# Patient Record
Sex: MALE | Race: WHITE | NOT HISPANIC OR LATINO | Employment: UNEMPLOYED | URBAN - METROPOLITAN AREA
[De-identification: names, ages, dates, MRNs, and addresses within clinical notes are randomized per-mention and may not be internally consistent; named-entity substitution may affect disease eponyms.]

---

## 2022-12-31 ENCOUNTER — HOSPITAL ENCOUNTER (EMERGENCY)
Facility: HOSPITAL | Age: 62
Discharge: HOME | End: 2022-12-31
Attending: STUDENT IN AN ORGANIZED HEALTH CARE EDUCATION/TRAINING PROGRAM
Payer: MEDICAID

## 2022-12-31 VITALS
DIASTOLIC BLOOD PRESSURE: 91 MMHG | HEIGHT: 74 IN | RESPIRATION RATE: 18 BRPM | WEIGHT: 230 LBS | OXYGEN SATURATION: 99 % | BODY MASS INDEX: 29.52 KG/M2 | HEART RATE: 79 BPM | SYSTOLIC BLOOD PRESSURE: 143 MMHG | TEMPERATURE: 99 F

## 2022-12-31 DIAGNOSIS — U07.1 COVID: Primary | ICD-10-CM

## 2022-12-31 DIAGNOSIS — R55 SYNCOPE, UNSPECIFIED SYNCOPE TYPE: ICD-10-CM

## 2022-12-31 DIAGNOSIS — R00.0 TACHYCARDIA: ICD-10-CM

## 2022-12-31 LAB
ALBUMIN SERPL-MCNC: 3.7 G/DL (ref 3.4–4.8)
ALBUMIN/GLOB SERPL: 1.1 RATIO (ref 1.1–2)
ALP SERPL-CCNC: 110 UNIT/L (ref 40–150)
ALT SERPL-CCNC: 9 UNIT/L (ref 0–55)
AMPHET UR QL SCN: POSITIVE
APPEARANCE UR: CLEAR
AST SERPL-CCNC: 20 UNIT/L (ref 5–34)
BACTERIA #/AREA URNS AUTO: NORMAL /HPF
BARBITURATE SCN PRESENT UR: NEGATIVE
BASOPHILS # BLD AUTO: 0.02 X10(3)/MCL (ref 0–0.2)
BASOPHILS NFR BLD AUTO: 0.5 %
BENZODIAZ UR QL SCN: NEGATIVE
BILIRUB UR QL STRIP.AUTO: NEGATIVE MG/DL
BILIRUBIN DIRECT+TOT PNL SERPL-MCNC: 0.7 MG/DL
BUN SERPL-MCNC: 14.7 MG/DL (ref 8.4–25.7)
CALCIUM SERPL-MCNC: 8.8 MG/DL (ref 8.8–10)
CANNABINOIDS UR QL SCN: NEGATIVE
CHLORIDE SERPL-SCNC: 108 MMOL/L (ref 98–107)
CO2 SERPL-SCNC: 21 MMOL/L (ref 23–31)
COCAINE UR QL SCN: POSITIVE
COLOR UR AUTO: YELLOW
CORRECTED TEMPERATURE (PCO2): 31 MMHG (ref 35–45)
CORRECTED TEMPERATURE (PH): 7.45 (ref 7.35–7.45)
CORRECTED TEMPERATURE (PO2): 103 MMHG (ref 80–100)
CREAT SERPL-MCNC: 1.08 MG/DL (ref 0.73–1.18)
EOSINOPHIL # BLD AUTO: 0.01 X10(3)/MCL (ref 0–0.9)
EOSINOPHIL NFR BLD AUTO: 0.3 %
ERYTHROCYTE [DISTWIDTH] IN BLOOD BY AUTOMATED COUNT: 13 % (ref 11.6–14.4)
ETHANOL SERPL-MCNC: <10 MG/DL
FENTANYL UR QL SCN: NEGATIVE
FLUAV AG UPPER RESP QL IA.RAPID: NOT DETECTED
FLUBV AG UPPER RESP QL IA.RAPID: NOT DETECTED
GFR SERPLBLD CREATININE-BSD FMLA CKD-EPI: >60 MLS/MIN/1.73/M2
GLOBULIN SER-MCNC: 3.5 GM/DL (ref 2.4–3.5)
GLUCOSE SERPL-MCNC: 104 MG/DL (ref 82–115)
GLUCOSE UR QL STRIP.AUTO: NEGATIVE MG/DL
HCO3 UR-SCNC: 21.5 MMOL/L (ref 22–26)
HCT VFR BLD AUTO: 39.9 % (ref 42–52)
HGB BLD-MCNC: 13.7 GM/DL (ref 14–18)
HGB BLD-MCNC: 13.8 G/DL (ref 12–16)
IMM GRANULOCYTES # BLD AUTO: 0.01 X10(3)/MCL (ref 0–0.04)
IMM GRANULOCYTES NFR BLD AUTO: 0.3 %
KETONES UR QL STRIP.AUTO: ABNORMAL MG/DL
LEUKOCYTE ESTERASE UR QL STRIP.AUTO: NEGATIVE UNIT/L
LYMPHOCYTES # BLD AUTO: 0.82 X10(3)/MCL (ref 0.6–4.6)
LYMPHOCYTES NFR BLD AUTO: 21.5 %
MAGNESIUM SERPL-MCNC: 2.1 MG/DL (ref 1.6–2.6)
MCH RBC QN AUTO: 31.6 PG
MCHC RBC AUTO-ENTMCNC: 34.3 MG/DL (ref 33–36)
MCV RBC AUTO: 91.9 FL (ref 80–94)
MDMA UR QL SCN: NEGATIVE
MONOCYTES # BLD AUTO: 0.53 X10(3)/MCL (ref 0.1–1.3)
MONOCYTES NFR BLD AUTO: 13.9 %
NEUTROPHILS # BLD AUTO: 2.43 X10(3)/MCL (ref 2.1–9.2)
NEUTROPHILS NFR BLD AUTO: 63.5 %
NITRITE UR QL STRIP.AUTO: NEGATIVE
NRBC BLD AUTO-RTO: 0 % (ref 0–1)
OPIATES UR QL SCN: NEGATIVE
PCO2 BLDA: 31 MMHG (ref 35–45)
PCP UR QL: NEGATIVE
PH SMN: 7.45 [PH] (ref 7.35–7.45)
PH UR STRIP.AUTO: 5.5 [PH]
PH UR: 5.5 [PH] (ref 3–11)
PLATELET # BLD AUTO: 123 X10(3)/MCL (ref 140–371)
PMV BLD AUTO: 9.4 FL (ref 9.4–12.4)
PO2 BLDA: 103 MMHG (ref 80–100)
POC BASE DEFICIT: -1.6 MMOL/L (ref -2–2)
POC COHB: 1.7 %
POC IONIZED CALCIUM: 1.15 MMOL/L (ref 1.12–1.23)
POC METHB: 0.8 % (ref 0.4–1.5)
POC O2HB: 96.4 % (ref 94–97)
POC SATURATED O2: 98.2 %
POC TEMPERATURE: 37 °C
POCT GLUCOSE: 100 MG/DL (ref 70–110)
POTASSIUM BLD-SCNC: 3.1 MMOL/L (ref 3.5–5)
POTASSIUM SERPL-SCNC: 3.4 MMOL/L (ref 3.5–5.1)
PROT SERPL-MCNC: 7.2 GM/DL (ref 5.8–7.6)
PROT UR QL STRIP.AUTO: ABNORMAL MG/DL
RBC # BLD AUTO: 4.34 X10(6)/MCL (ref 4.7–6.1)
RBC #/AREA URNS AUTO: <5 /HPF
RBC UR QL AUTO: NEGATIVE UNIT/L
SARS-COV-2 RNA RESP QL NAA+PROBE: DETECTED
SODIUM BLD-SCNC: 137 MMOL/L (ref 137–145)
SODIUM SERPL-SCNC: 139 MMOL/L (ref 136–145)
SP GR UR STRIP.AUTO: 1.02 (ref 1–1.03)
SPECIMEN SOURCE: ABNORMAL
SQUAMOUS #/AREA URNS AUTO: <5 /HPF
TROPONIN I SERPL-MCNC: 0.02 NG/ML (ref 0–0.04)
TSH SERPL-ACNC: 0.98 UIU/ML (ref 0.35–4.94)
UROBILINOGEN UR STRIP-ACNC: 1 MG/DL
WBC # SPEC AUTO: 3.8 X10(3)/MCL (ref 4.5–11.5)
WBC #/AREA URNS AUTO: <5 /HPF

## 2022-12-31 PROCEDURE — 80053 COMPREHEN METABOLIC PANEL: CPT | Performed by: STUDENT IN AN ORGANIZED HEALTH CARE EDUCATION/TRAINING PROGRAM

## 2022-12-31 PROCEDURE — 36600 WITHDRAWAL OF ARTERIAL BLOOD: CPT

## 2022-12-31 PROCEDURE — 84443 ASSAY THYROID STIM HORMONE: CPT | Performed by: STUDENT IN AN ORGANIZED HEALTH CARE EDUCATION/TRAINING PROGRAM

## 2022-12-31 PROCEDURE — 80307 DRUG TEST PRSMV CHEM ANLYZR: CPT | Performed by: STUDENT IN AN ORGANIZED HEALTH CARE EDUCATION/TRAINING PROGRAM

## 2022-12-31 PROCEDURE — 63600175 PHARM REV CODE 636 W HCPCS: Performed by: STUDENT IN AN ORGANIZED HEALTH CARE EDUCATION/TRAINING PROGRAM

## 2022-12-31 PROCEDURE — 82077 ASSAY SPEC XCP UR&BREATH IA: CPT | Performed by: STUDENT IN AN ORGANIZED HEALTH CARE EDUCATION/TRAINING PROGRAM

## 2022-12-31 PROCEDURE — 82803 BLOOD GASES ANY COMBINATION: CPT

## 2022-12-31 PROCEDURE — 84484 ASSAY OF TROPONIN QUANT: CPT | Performed by: STUDENT IN AN ORGANIZED HEALTH CARE EDUCATION/TRAINING PROGRAM

## 2022-12-31 PROCEDURE — 99900035 HC TECH TIME PER 15 MIN (STAT)

## 2022-12-31 PROCEDURE — 82962 GLUCOSE BLOOD TEST: CPT

## 2022-12-31 PROCEDURE — 0240U COVID/FLU A&B PCR: CPT | Performed by: STUDENT IN AN ORGANIZED HEALTH CARE EDUCATION/TRAINING PROGRAM

## 2022-12-31 PROCEDURE — 99284 EMERGENCY DEPT VISIT MOD MDM: CPT | Mod: 25

## 2022-12-31 PROCEDURE — 85025 COMPLETE CBC W/AUTO DIFF WBC: CPT | Performed by: STUDENT IN AN ORGANIZED HEALTH CARE EDUCATION/TRAINING PROGRAM

## 2022-12-31 PROCEDURE — 83735 ASSAY OF MAGNESIUM: CPT | Performed by: STUDENT IN AN ORGANIZED HEALTH CARE EDUCATION/TRAINING PROGRAM

## 2022-12-31 PROCEDURE — 81001 URINALYSIS AUTO W/SCOPE: CPT | Mod: 59 | Performed by: STUDENT IN AN ORGANIZED HEALTH CARE EDUCATION/TRAINING PROGRAM

## 2022-12-31 PROCEDURE — 96360 HYDRATION IV INFUSION INIT: CPT

## 2022-12-31 RX ORDER — METOPROLOL TARTRATE 50 MG/1
50 TABLET ORAL 2 TIMES DAILY
COMMUNITY

## 2022-12-31 RX ADMIN — SODIUM CHLORIDE, POTASSIUM CHLORIDE, SODIUM LACTATE AND CALCIUM CHLORIDE 1000 ML: 600; 310; 30; 20 INJECTION, SOLUTION INTRAVENOUS at 03:12

## 2022-12-31 NOTE — ED PROVIDER NOTES
Encounter Date: 12/31/2022    SCRIBE #1 NOTE: I, Delroy Norris, am scribing for, and in the presence of,  Deniz Coleman MD. I have scribed the following portions of the note - the EKG reading. Other sections scribed: HPI, ROS, PE.     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  States has happened to him before. Denies any CP, SOB. Patient states he does have a cardiac history, but isn't sure what his diagnoses were.      61 y/o male with a history of HTN presents to the ED with complaints of dizziness and near-syncope. Pt was working in somebody's house when he stood up, became lightheaded and dizzy, and almost passed out. He denies syncope, falls, LOC, and hitting his head. Notes that this is similar to event that happened 2 years ago but does not remember any diagnoses from the event. Reports that he is compliant with his metoprolol. Admits to drinking and smoking cocaine, denies any pain. Pt is a poor historian.    The history is provided by the patient. No  was used.   Illness   The current episode started today. Exacerbated by: standing. Pertinent negatives include no fever, no abdominal pain, no constipation, no diarrhea, no nausea, no vomiting, no congestion, no ear discharge, no ear pain, no headaches, no rhinorrhea, no sore throat, no neck pain, no cough, no shortness of breath, no pain and no eye redness.   Review of patient's allergies indicates:  No Known Allergies  Past Medical History:   Diagnosis Date    Hypertension      History reviewed. No pertinent surgical history.  History reviewed. No pertinent family history.  Social History     Tobacco Use    Smokeless tobacco: Never   Substance Use Topics    Alcohol use: Yes     Alcohol/week: 3.0 standard drinks     Types: 3 Standard drinks or equivalent per week    Drug use: Yes      Frequency: 1.0 times per week     Types: Cocaine, Methamphetamines     Review of Systems   Constitutional:  Negative for chills, fatigue and fever.   HENT:  Negative for congestion, ear discharge, ear pain, nosebleeds, rhinorrhea and sore throat.    Eyes:  Negative for pain, redness and visual disturbance.   Respiratory:  Negative for cough, chest tightness and shortness of breath.    Cardiovascular:  Negative for chest pain and leg swelling.   Gastrointestinal:  Negative for abdominal pain, blood in stool, constipation, diarrhea, nausea and vomiting.   Genitourinary:  Negative for dysuria and hematuria.   Musculoskeletal:  Negative for arthralgias, joint swelling, myalgias and neck pain.   Skin:  Negative for color change, pallor and wound.   Neurological:  Positive for dizziness and light-headedness. Negative for weakness, numbness and headaches.     Physical Exam     Initial Vitals   BP Pulse Resp Temp SpO2   12/31/22 1215 12/31/22 1215 12/31/22 1215 12/31/22 1217 12/31/22 1215   (!) 151/99 (!) 111 19 99 °F (37.2 °C) 98 %      MAP       --                Physical Exam    Nursing note and vitals reviewed.  Constitutional: He appears well-developed and well-nourished. He is not diaphoretic. No distress.   HENT:   Head: Normocephalic and atraumatic.   Right Ear: External ear normal.   Left Ear: External ear normal.   Nose: Nose normal.   Mouth/Throat: Oropharynx is clear and moist.   Eyes: Conjunctivae and EOM are normal. Pupils are equal, round, and reactive to light. Right eye exhibits no discharge. Left eye exhibits no discharge.   Neck: Neck supple. No tracheal deviation present.   Normal range of motion.  Cardiovascular:  Normal rate, regular rhythm, normal heart sounds and intact distal pulses.     Exam reveals no gallop and no friction rub.       No murmur heard.  Pulmonary/Chest: Breath sounds normal. No stridor. No respiratory distress. He has no wheezes. He has no rhonchi. He has no rales. He exhibits no  tenderness.   Abdominal: Abdomen is soft. Bowel sounds are normal. He exhibits no distension and no mass. There is no abdominal tenderness. There is no guarding.   Genitourinary:    Genitourinary Comments: Hernia in the scrotum; non-tender     Musculoskeletal:         General: No tenderness or edema. Normal range of motion.      Cervical back: Normal range of motion and neck supple.     Neurological: He is alert and oriented to person, place, and time. No cranial nerve deficit or sensory deficit.   Skin: Skin is warm and dry. Capillary refill takes less than 2 seconds. No rash noted. No erythema. No pallor.       ED Course   Procedures  Labs Reviewed   COMPREHENSIVE METABOLIC PANEL - Abnormal; Notable for the following components:       Result Value    Potassium Level 3.4 (*)     Chloride 108 (*)     Carbon Dioxide 21 (*)     All other components within normal limits   COVID/FLU A&B PCR - Abnormal; Notable for the following components:    SARS-CoV-2 PCR Detected (*)     All other components within normal limits    Narrative:     The Xpert Xpress SARS-CoV-2/FLU/RSV plus is a rapid, multiplexed real-time PCR test intended for the simultaneous qualitative detection and differentiation of SARS-CoV-2, Influenza A, Influenza B, and respiratory syncytial virus (RSV) viral RNA in either nasopharyngeal swab or nasal swab specimens.         URINALYSIS, REFLEX TO URINE CULTURE - Abnormal; Notable for the following components:    Protein, UA 1+ (*)     Ketones, UA 1+ (*)     All other components within normal limits   CBC WITH DIFFERENTIAL - Abnormal; Notable for the following components:    WBC 3.8 (*)     RBC 4.34 (*)     Hgb 13.7 (*)     Hct 39.9 (*)     Platelet 123 (*)     All other components within normal limits   DRUG SCREEN, URINE (BEAKER) - Abnormal; Notable for the following components:    Amphetamines, Urine Positive (*)     Cocaine, Urine Positive (*)     All other components within normal limits    Narrative:      Cut off concentrations:    Amphetamines - 1000 ng/ml  Barbiturates - 200 ng/ml  Benzodiazepine - 200 ng/ml  Cannabinoids (THC) - 50 ng/ml  Cocaine - 300 ng/ml  Fentanyl - 1.0 ng/ml  MDMA - 500 ng/ml  Opiates - 300 ng/ml   Phencyclidine (PCP) - 25 ng/ml    Specimen submitted for drug analysis and tested for pH and specific gravity in order to evaluate sample integrity. Suspect tampering if specific gravity is <1.003 and/or pH is not within the range of 4.5 - 8.0  False negatives may result form substances such as bleach added to urine.  False positives may result for the presence of a substance with similar chemical structure to the drug or its metabolite.    This test provides only a PRELIMINARY analytical test result. A more specific alternate chemical method must be used in order to obtain a confirmed analytical result. Gas chromatography/mass spectrometry (GC/MS) is the preferred confirmatory method. Other chemical confirmation methods are available. Clinical consideration and professional judgement should be applied to any drug of abuse test result, particularly when preliminary positive results are used.    Positive results will be confirmed only at the physicians request. Unconfirmed screening results are to be used only for medical purposes (treatment).        MAGNESIUM - Normal   TROPONIN I - Normal   TSH - Normal   ALCOHOL,MEDICAL (ETHANOL) - Normal   URINALYSIS, MICROSCOPIC - Normal   CBC W/ AUTO DIFFERENTIAL    Narrative:     The following orders were created for panel order CBC auto differential.  Procedure                               Abnormality         Status                     ---------                               -----------         ------                     CBC with Differential[[id number]]        Abnormal            Final result                 Please view results for these tests on the individual orders.   POCT GLUCOSE     EKG Readings: (Independently Interpreted)   Rhythm: Sinus Tachycardia.  Heart Rate: 107. ST Segments: Normal ST Segments. Axis: Left Axis Deviation.   Time: 12:19    Good R wave progression     Imaging Results              X-Ray Chest AP Portable (Final result)  Result time 12/31/22 13:28:15      Final result by Joey Freedman MD (12/31/22 13:28:15)                   Impression:      NO ACUTE CARDIOPULMONARY PROCESS IDENTIFIED.      Electronically signed by: Joey Freedman  Date:    12/31/2022  Time:    13:28               Narrative:    EXAMINATION:  XR CHEST AP PORTABLE    CLINICAL HISTORY:  syncope;    TECHNIQUE:  One view    COMPARISON:  August 5, 2020.    FINDINGS:  Cardiopericardial silhouette is within normal limits. Lungs are without dense focal or segmental consolidation, congestive process, pleural effusions or pneumothorax.                                       Medications   lactated ringers bolus 1,000 mL (0 mLs Intravenous Stopped 12/31/22 1621)     Medical Decision Making:   Initial Assessment:   Patient presents emergency department reports that he felt dizzy earlier today  Differential Diagnosis:   Near syncope, cardiogenic syncope, dehydration, infection, COVID, flu, drug abuse from alcohol intoxication  Clinical Tests:   Lab Tests: Ordered and Reviewed  Radiological Study: Reviewed and Ordered  Medical Tests: Reviewed and Ordered  ED Management:  Patient is awake alert generally well-appearing.  He is tachycardic.  He reports he felt lightheaded earlier today but never lost consciousness.  Denies falling or striking head.  He states he had similar episodes in the past but does not remember what occurred then.  Patient's labs were largely unremarkable, he does admit to cocaine and amphetamine abuse which does appear UDS.  His eyeball is undetectable.  He is unfortunately COVID positive this may be the source of his symptoms.  Denies any shortness of breath or chest pain.  Chest x-ray unremarkable, oxygen saturation greater than 95% on room air.  His tachycardia  resolves after 1 L of fluid here in the emergency department, he is not orthostatic.  He is suitable for discharge home at this time.  He only takes metoprolol will be discharged with paxlovid.  I gave offer patient admission however he declined he is comfortable going home.  Patient will be discharged home at this time. Return precautions given.  Questions invited, questions answered to the best my ability.  Patient discharged home condition stable.          Scribe Attestation:   Scribe #1: I performed the above scribed service and the documentation accurately describes the services I performed. I attest to the accuracy of the note.    Attending Attestation:           Physician Attestation for Scribe:  Physician Attestation Statement for Scribe #1: I, Deniz Coleman MD, reviewed documentation, as scribed by Delroy Norris in my presence, and it is both accurate and complete.                        Clinical Impression:   Final diagnoses:  [U07.1] COVID (Primary)  [R00.0] Tachycardia  [R55] Syncope, unspecified syncope type        ED Disposition Condition    Discharge Stable          ED Prescriptions       Medication Sig Dispense Start Date End Date Auth. Provider    nirmatrelvir-ritonavir 300 mg (150 mg x 2)-100 mg copackaged tablets (EUA) Take 3 tablets by mouth 2 (two) times daily for 5 days. Each dose contains 2 nirmatrelvir (pink tablets) and 1 ritonavir (white tablet). Take all 3 tablets together 30 tablet 12/31/2022 1/5/2023 Deniz Coleman MD          Follow-up Information       Follow up With Specialties Details Why Contact Info    [facility]   [address] [place] [id number] [phone]               Deniz Coleman MD  12/31/22 5039

## 2023-02-23 ENCOUNTER — HOSPITAL ENCOUNTER (OUTPATIENT)
Facility: HOSPITAL | Age: 63
Discharge: HOME | DRG: 690 | End: 2023-02-25
Attending: STUDENT IN AN ORGANIZED HEALTH CARE EDUCATION/TRAINING PROGRAM | Admitting: STUDENT IN AN ORGANIZED HEALTH CARE EDUCATION/TRAINING PROGRAM
Payer: MEDICAID

## 2023-02-23 DIAGNOSIS — K40.91 UNILATERAL RECURRENT INGUINAL HERNIA WITHOUT OBSTRUCTION OR GANGRENE: Primary | ICD-10-CM

## 2023-02-23 DIAGNOSIS — R07.9 CHEST PAIN: ICD-10-CM

## 2023-02-23 DIAGNOSIS — R10.9 FLANK PAIN: ICD-10-CM

## 2023-02-23 DIAGNOSIS — N12 PYELONEPHRITIS: ICD-10-CM

## 2023-02-23 DIAGNOSIS — I50.9 CHF (CONGESTIVE HEART FAILURE): ICD-10-CM

## 2023-02-23 PROBLEM — N13.5 URETEROVESICAL JUNCTION (UVJ) OBSTRUCTION: Status: ACTIVE | Noted: 2023-02-23

## 2023-02-23 LAB
ALBUMIN SERPL-MCNC: 4.1 G/DL (ref 3.4–4.8)
ALBUMIN/GLOB SERPL: 1.2 RATIO (ref 1.1–2)
ALP SERPL-CCNC: 128 UNIT/L (ref 40–150)
ALT SERPL-CCNC: 13 UNIT/L (ref 0–55)
APPEARANCE UR: CLEAR
AST SERPL-CCNC: 21 UNIT/L (ref 5–34)
BACTERIA #/AREA URNS AUTO: ABNORMAL /HPF
BASOPHILS # BLD AUTO: 0.05 X10(3)/MCL (ref 0–0.2)
BASOPHILS NFR BLD AUTO: 0.5 %
BILIRUB UR QL STRIP.AUTO: NEGATIVE MG/DL
BILIRUBIN DIRECT+TOT PNL SERPL-MCNC: 1.3 MG/DL
BUN SERPL-MCNC: 21.5 MG/DL (ref 8.4–25.7)
CALCIUM SERPL-MCNC: 9 MG/DL (ref 8.8–10)
CHLORIDE SERPL-SCNC: 104 MMOL/L (ref 98–107)
CO2 SERPL-SCNC: 25 MMOL/L (ref 23–31)
COLOR UR AUTO: YELLOW
CREAT SERPL-MCNC: 1.38 MG/DL (ref 0.73–1.18)
EOSINOPHIL # BLD AUTO: 0.05 X10(3)/MCL (ref 0–0.9)
EOSINOPHIL NFR BLD AUTO: 0.5 %
ERYTHROCYTE [DISTWIDTH] IN BLOOD BY AUTOMATED COUNT: 13.5 % (ref 11.5–17)
GFR SERPLBLD CREATININE-BSD FMLA CKD-EPI: 58 MLS/MIN/1.73/M2
GLOBULIN SER-MCNC: 3.3 GM/DL (ref 2.4–3.5)
GLUCOSE SERPL-MCNC: 121 MG/DL (ref 82–115)
GLUCOSE UR QL STRIP.AUTO: NEGATIVE MG/DL
HCT VFR BLD AUTO: 43.6 % (ref 42–52)
HGB BLD-MCNC: 15.3 G/DL (ref 14–18)
IMM GRANULOCYTES # BLD AUTO: 0.04 X10(3)/MCL (ref 0–0.04)
IMM GRANULOCYTES NFR BLD AUTO: 0.4 %
KETONES UR QL STRIP.AUTO: NEGATIVE MG/DL
LEUKOCYTE ESTERASE UR QL STRIP.AUTO: NEGATIVE UNIT/L
LYMPHOCYTES # BLD AUTO: 1.31 X10(3)/MCL (ref 0.6–4.6)
LYMPHOCYTES NFR BLD AUTO: 14.3 %
MCH RBC QN AUTO: 31.8 PG
MCHC RBC AUTO-ENTMCNC: 35.1 G/DL (ref 33–36)
MCV RBC AUTO: 90.6 FL (ref 80–94)
MONOCYTES # BLD AUTO: 0.81 X10(3)/MCL (ref 0.1–1.3)
MONOCYTES NFR BLD AUTO: 8.9 %
NEUTROPHILS # BLD AUTO: 6.89 X10(3)/MCL (ref 2.1–9.2)
NEUTROPHILS NFR BLD AUTO: 75.4 %
NITRITE UR QL STRIP.AUTO: NEGATIVE
NRBC BLD AUTO-RTO: 0 %
PH UR STRIP.AUTO: 6 [PH]
PLATELET # BLD AUTO: 184 X10(3)/MCL (ref 130–400)
PMV BLD AUTO: 9.7 FL (ref 7.4–10.4)
POTASSIUM SERPL-SCNC: 4.1 MMOL/L (ref 3.5–5.1)
PROT SERPL-MCNC: 7.4 GM/DL (ref 5.8–7.6)
PROT UR QL STRIP.AUTO: ABNORMAL MG/DL
RBC # BLD AUTO: 4.81 X10(6)/MCL (ref 4.7–6.1)
RBC #/AREA URNS AUTO: 16 /HPF
RBC UR QL AUTO: ABNORMAL UNIT/L
SODIUM SERPL-SCNC: 135 MMOL/L (ref 136–145)
SP GR UR STRIP.AUTO: 1.02 (ref 1–1.03)
SQUAMOUS #/AREA URNS AUTO: <5 /HPF
UROBILINOGEN UR STRIP-ACNC: 0.2 MG/DL
WBC # SPEC AUTO: 9.2 X10(3)/MCL (ref 4.5–11.5)
WBC #/AREA URNS AUTO: <5 /HPF

## 2023-02-23 PROCEDURE — G0378 HOSPITAL OBSERVATION PER HR: HCPCS

## 2023-02-23 PROCEDURE — 87040 BLOOD CULTURE FOR BACTERIA: CPT | Performed by: STUDENT IN AN ORGANIZED HEALTH CARE EDUCATION/TRAINING PROGRAM

## 2023-02-23 PROCEDURE — 25000003 PHARM REV CODE 250: Performed by: STUDENT IN AN ORGANIZED HEALTH CARE EDUCATION/TRAINING PROGRAM

## 2023-02-23 PROCEDURE — 63600175 PHARM REV CODE 636 W HCPCS

## 2023-02-23 PROCEDURE — 93005 ELECTROCARDIOGRAM TRACING: CPT

## 2023-02-23 PROCEDURE — 80053 COMPREHEN METABOLIC PANEL: CPT | Performed by: STUDENT IN AN ORGANIZED HEALTH CARE EDUCATION/TRAINING PROGRAM

## 2023-02-23 PROCEDURE — 96361 HYDRATE IV INFUSION ADD-ON: CPT

## 2023-02-23 PROCEDURE — 93010 EKG 12-LEAD: ICD-10-PCS | Mod: ,,, | Performed by: STUDENT IN AN ORGANIZED HEALTH CARE EDUCATION/TRAINING PROGRAM

## 2023-02-23 PROCEDURE — 96376 TX/PRO/DX INJ SAME DRUG ADON: CPT

## 2023-02-23 PROCEDURE — 99285 EMERGENCY DEPT VISIT HI MDM: CPT | Mod: 25

## 2023-02-23 PROCEDURE — 85025 COMPLETE CBC W/AUTO DIFF WBC: CPT | Performed by: STUDENT IN AN ORGANIZED HEALTH CARE EDUCATION/TRAINING PROGRAM

## 2023-02-23 PROCEDURE — 25500020 PHARM REV CODE 255

## 2023-02-23 PROCEDURE — 63600175 PHARM REV CODE 636 W HCPCS: Performed by: STUDENT IN AN ORGANIZED HEALTH CARE EDUCATION/TRAINING PROGRAM

## 2023-02-23 PROCEDURE — 25000003 PHARM REV CODE 250

## 2023-02-23 PROCEDURE — 81001 URINALYSIS AUTO W/SCOPE: CPT | Performed by: STUDENT IN AN ORGANIZED HEALTH CARE EDUCATION/TRAINING PROGRAM

## 2023-02-23 PROCEDURE — 96365 THER/PROPH/DIAG IV INF INIT: CPT | Mod: 59

## 2023-02-23 PROCEDURE — 93010 ELECTROCARDIOGRAM REPORT: CPT | Mod: ,,, | Performed by: STUDENT IN AN ORGANIZED HEALTH CARE EDUCATION/TRAINING PROGRAM

## 2023-02-23 PROCEDURE — 96375 TX/PRO/DX INJ NEW DRUG ADDON: CPT

## 2023-02-23 PROCEDURE — 11000001 HC ACUTE MED/SURG PRIVATE ROOM

## 2023-02-23 RX ORDER — METOPROLOL TARTRATE 50 MG/1
50 TABLET ORAL 2 TIMES DAILY
Status: DISCONTINUED | OUTPATIENT
Start: 2023-02-24 | End: 2023-02-25 | Stop reason: HOSPADM

## 2023-02-23 RX ORDER — CLONIDINE HYDROCHLORIDE 0.2 MG/1
0.2 TABLET ORAL 3 TIMES DAILY PRN
Status: DISCONTINUED | OUTPATIENT
Start: 2023-02-23 | End: 2023-02-25 | Stop reason: HOSPADM

## 2023-02-23 RX ORDER — KETOROLAC TROMETHAMINE 30 MG/ML
15 INJECTION, SOLUTION INTRAMUSCULAR; INTRAVENOUS
Status: COMPLETED | OUTPATIENT
Start: 2023-02-23 | End: 2023-02-23

## 2023-02-23 RX ORDER — ONDANSETRON 2 MG/ML
4 INJECTION INTRAMUSCULAR; INTRAVENOUS
Status: COMPLETED | OUTPATIENT
Start: 2023-02-23 | End: 2023-02-23

## 2023-02-23 RX ORDER — PROCHLORPERAZINE EDISYLATE 5 MG/ML
5 INJECTION INTRAMUSCULAR; INTRAVENOUS EVERY 6 HOURS PRN
Status: DISCONTINUED | OUTPATIENT
Start: 2023-02-24 | End: 2023-02-25 | Stop reason: HOSPADM

## 2023-02-23 RX ORDER — POLYETHYLENE GLYCOL 3350 17 G/17G
17 POWDER, FOR SOLUTION ORAL 2 TIMES DAILY PRN
Status: DISCONTINUED | OUTPATIENT
Start: 2023-02-24 | End: 2023-02-25 | Stop reason: HOSPADM

## 2023-02-23 RX ORDER — ONDANSETRON 2 MG/ML
4 INJECTION INTRAMUSCULAR; INTRAVENOUS EVERY 4 HOURS PRN
Status: DISCONTINUED | OUTPATIENT
Start: 2023-02-24 | End: 2023-02-25 | Stop reason: HOSPADM

## 2023-02-23 RX ORDER — MORPHINE SULFATE 4 MG/ML
4 INJECTION, SOLUTION INTRAMUSCULAR; INTRAVENOUS
Status: COMPLETED | OUTPATIENT
Start: 2023-02-23 | End: 2023-02-23

## 2023-02-23 RX ORDER — SODIUM CHLORIDE 0.9 % (FLUSH) 0.9 %
10 SYRINGE (ML) INJECTION
Status: DISCONTINUED | OUTPATIENT
Start: 2023-02-24 | End: 2023-02-25 | Stop reason: HOSPADM

## 2023-02-23 RX ORDER — TALC
6 POWDER (GRAM) TOPICAL NIGHTLY PRN
Status: DISCONTINUED | OUTPATIENT
Start: 2023-02-24 | End: 2023-02-25 | Stop reason: HOSPADM

## 2023-02-23 RX ORDER — HYDROCODONE BITARTRATE AND ACETAMINOPHEN 5; 325 MG/1; MG/1
1 TABLET ORAL EVERY 6 HOURS PRN
Status: DISCONTINUED | OUTPATIENT
Start: 2023-02-24 | End: 2023-02-25 | Stop reason: HOSPADM

## 2023-02-23 RX ORDER — SODIUM CHLORIDE 9 MG/ML
INJECTION, SOLUTION INTRAVENOUS CONTINUOUS
Status: DISCONTINUED | OUTPATIENT
Start: 2023-02-23 | End: 2023-02-25 | Stop reason: HOSPADM

## 2023-02-23 RX ORDER — MORPHINE SULFATE 4 MG/ML
4 INJECTION, SOLUTION INTRAMUSCULAR; INTRAVENOUS EVERY 4 HOURS PRN
Status: DISCONTINUED | OUTPATIENT
Start: 2023-02-23 | End: 2023-02-25 | Stop reason: HOSPADM

## 2023-02-23 RX ORDER — AMOXICILLIN 250 MG
2 CAPSULE ORAL 2 TIMES DAILY PRN
Status: DISCONTINUED | OUTPATIENT
Start: 2023-02-24 | End: 2023-02-25 | Stop reason: HOSPADM

## 2023-02-23 RX ORDER — ACETAMINOPHEN 500 MG
1000 TABLET ORAL EVERY 6 HOURS PRN
Status: DISCONTINUED | OUTPATIENT
Start: 2023-02-24 | End: 2023-02-25 | Stop reason: HOSPADM

## 2023-02-23 RX ORDER — ACETAMINOPHEN 325 MG/1
650 TABLET ORAL EVERY 4 HOURS PRN
Status: DISCONTINUED | OUTPATIENT
Start: 2023-02-24 | End: 2023-02-25 | Stop reason: HOSPADM

## 2023-02-23 RX ORDER — HYDRALAZINE HYDROCHLORIDE 20 MG/ML
10 INJECTION INTRAMUSCULAR; INTRAVENOUS EVERY 4 HOURS PRN
Status: DISCONTINUED | OUTPATIENT
Start: 2023-02-23 | End: 2023-02-25 | Stop reason: HOSPADM

## 2023-02-23 RX ORDER — MAG HYDROX/ALUMINUM HYD/SIMETH 200-200-20
30 SUSPENSION, ORAL (FINAL DOSE FORM) ORAL 4 TIMES DAILY PRN
Status: DISCONTINUED | OUTPATIENT
Start: 2023-02-24 | End: 2023-02-25 | Stop reason: HOSPADM

## 2023-02-23 RX ADMIN — KETOROLAC TROMETHAMINE 15 MG: 30 INJECTION, SOLUTION INTRAMUSCULAR; INTRAVENOUS at 12:02

## 2023-02-23 RX ADMIN — MORPHINE SULFATE 4 MG: 4 INJECTION INTRAVENOUS at 05:02

## 2023-02-23 RX ADMIN — CEFTRIAXONE 1 G: 1 INJECTION, POWDER, FOR SOLUTION INTRAMUSCULAR; INTRAVENOUS at 05:02

## 2023-02-23 RX ADMIN — ONDANSETRON 4 MG: 2 INJECTION INTRAMUSCULAR; INTRAVENOUS at 12:02

## 2023-02-23 RX ADMIN — ONDANSETRON 4 MG: 2 INJECTION INTRAMUSCULAR; INTRAVENOUS at 05:02

## 2023-02-23 RX ADMIN — SODIUM CHLORIDE 1000 ML: 9 INJECTION, SOLUTION INTRAVENOUS at 12:02

## 2023-02-23 RX ADMIN — IOPAMIDOL 100 ML: 755 INJECTION, SOLUTION INTRAVENOUS at 02:02

## 2023-02-23 NOTE — ED PROVIDER NOTES
"Encounter Date: 2/23/2023    SCRIBE #1 NOTE: I, Nadeem Lowry, am scribing for, and in the presence of,  Feliberto Khan MD. I have scribed the following portions of the note - Other sections scribed: HPI, ROS, PE.     History     Chief Complaint   Patient presents with    Flank Pain     Pt to ER via [facility] for left flank pain.  Started last night.  Has known hernia (causes testicular/penile swelling).  EMS gave fentanyl 100 mcg     62 year old male with pmhx of HTN presents to ED via EMS c/o left flank pain onset last night. Pt reports that pain occurred after he ate bad chicken. Pt reports a burning sensation when he urinates that started today and a cold sweat last night. Pt denies fevers and hematuria. Pt states that he has a chronic hernia to his right groin. Pt reports additional symptom of bilateral knee pain that is chronic.   PCP: DANDRE Villegas    The history is provided by the patient. No  was used.   Flank Pain  Pertinent negatives include no chest pain, no abdominal pain and no shortness of breath.   Review of patient's allergies indicates:  No Known Allergies  Past Medical History:   Diagnosis Date    CHF (congestive heart failure)     CVA, old, dysarthria     Hypertension     Paroxysmal atrial fibrillation     Unilateral inguinal hernia, without obstruction or gangrene, not specified as recurrent     Right     History reviewed. No pertinent surgical history.  History reviewed. No pertinent family history.  Social History     Tobacco Use    Smoking status: Never    Smokeless tobacco: Never   Substance Use Topics    Alcohol use: Not Currently     Alcohol/week: 28.0 standard drinks     Types: 28 Cans of beer per week    Drug use: Yes     Frequency: 1.0 times per week     Types: Methamphetamines, "Crack" cocaine     Review of Systems   Constitutional:  Negative for chills and fever.   HENT:  Negative for drooling and sore throat.    Eyes:  Negative for pain and redness. "   Respiratory:  Negative for shortness of breath, wheezing and stridor.    Cardiovascular:  Negative for chest pain, palpitations and leg swelling.   Gastrointestinal:  Negative for abdominal pain, nausea and vomiting.   Genitourinary:  Positive for dysuria and flank pain. Negative for hematuria.   Musculoskeletal:  Negative for back pain, neck pain and neck stiffness.        Bilateral knee pain   Skin:  Negative for rash and wound.   Neurological:  Negative for weakness and numbness.   Hematological:  Does not bruise/bleed easily.     Physical Exam     Initial Vitals [02/23/23 0850]   BP Pulse Resp Temp SpO2   (!) 176/97 (!) 58 20 98.4 °F (36.9 °C) 100 %      MAP       --         Physical Exam    Nursing note and vitals reviewed.  Constitutional: He appears well-developed. He is not diaphoretic. No distress.   HENT:   Head: Normocephalic and atraumatic.   Nose: Nose normal.   Mouth/Throat: Oropharynx is clear and moist.   Eyes: Conjunctivae and EOM are normal. Pupils are equal, round, and reactive to light.   Cardiovascular:  Normal rate and regular rhythm.           No murmur heard.  Pulmonary/Chest: Breath sounds normal. No respiratory distress. He has no wheezes. He has no rales.   Abdominal: Abdomen is soft. He exhibits no distension. There is no abdominal tenderness. A hernia is present. Hernia confirmed positive in the right inguinal area (large extends to scrotum, non tender, no color change).   Generalized abdominal soreness, left flank tenderness   Musculoskeletal:      Right knee: Crepitus present.      Left knee: Crepitus present.      Comments: Able to raise knees     Neurological: He is alert and oriented to person, place, and time. He has normal strength. No cranial nerve deficit.   Skin: Skin is warm. Capillary refill takes less than 2 seconds. No rash noted.       ED Course   Procedures  Labs Reviewed   COMPREHENSIVE METABOLIC PANEL - Abnormal; Notable for the following components:       Result  Value    Sodium Level 135 (*)     Glucose Level 121 (*)     Creatinine 1.38 (*)     All other components within normal limits   URINALYSIS, REFLEX TO URINE CULTURE - Abnormal; Notable for the following components:    Protein, UA 1+ (*)     Blood, UA 2+ (*)     All other components within normal limits   URINALYSIS, MICROSCOPIC - Abnormal; Notable for the following components:    RBC, UA 16 (*)     All other components within normal limits   CBC W/ AUTO DIFFERENTIAL    Narrative:     The following orders were created for panel order CBC auto differential.  Procedure                               Abnormality         Status                     ---------                               -----------         ------                     CBC with Differential[[id number]]                            Final result                 Please view results for these tests on the individual orders.   CBC WITH DIFFERENTIAL     EKG Readings: (Independently Interpreted)   EKG independently interpreted by me.  EKG: SB @ 53, no STEMI, QTc 457   ECG Results              EKG 12-lead (Final result)  Result time 02/25/23 09:43:29      Final result by Interface, Lab In [facility] (02/25/23 09:43:29)                   Narrative:    Test Reason : R10.9,    Vent. Rate : 053 BPM     Atrial Rate : 053 BPM     P-R Int : 168 ms          QRS Dur : 114 ms      QT Int : 488 ms       P-R-T Axes : 043 -21 -22 degrees     QTc Int : 457 ms    Sinus bradycardia  Moderate voltage criteria for LVH, may be normal variant ( R in aVL ,   [place] product )  Septal infarct ,age undetermined  Abnormal ECG  Confirmed by Gil Sullivan MD ([id number]) on 2/25/2023 9:43:20 AM    Referred By: [id number]   SELF           Confirmed By:Gil Sullivan MD                                  Imaging Results               CT Abdomen Pelvis With Contrast (Final result)  Result time 02/23/23 14:27:41      Final result by Ramirez Rodriguez MD (02/23/23 14:27:41)                   Impression:       Perinephric stranding around the left kidney consistent with likely pyelonephritis    Filling defect in the urinary bladder at the UVJ on the left side.  Bladder lesion is suspected.  Cystogram or cystoscopy is recommended    Large inguinal hernia on the right side containing a portion of the cecum and multiple loops of small bowel.  The appendix is also seen within the hernia.  Some omentum is also seen in the large right inguinal hernia.  The hernia extends into the scrotal sac    This report was flagged in Epic as abnormal.      Electronically signed by: Ramirez Rodriguez  Date:    02/23/2023  Time:    14:27               Narrative:    EXAMINATION:  CT ABDOMEN PELVIS WITH CONTRAST    CLINICAL HISTORY:  Flank pain, kidney stone suspected;    TECHNIQUE:  Low dose axial images, sagittal and coronal reformations were obtained from the lung bases to the pubic symphysis following the IV administration of contrast. Automatic exposure control (AEC) is utilized to reduce patient radiation exposure.    COMPARISON:  None.    FINDINGS:  The lung bases are clear.    The liver appears normal.  No liver mass or lesion is seen.  Portal and hepatic veins appear normal.    The gallbladder appears normal.  No obvious gallstones are seen.  No biliary dilatation is seen.  No pericholecystic fluid is seen.    The pancreas appears normal.  No pancreatic mass or lesion is seen.    The spleen shows no acute abnormality.    The adrenal glands appear normal.  No adrenal nodule is seen.    There is perinephric stranding around the left kidney.  No hydronephrosis is seen.  No hydroureter is seen.  No nephrolithiasis is seen.  No obvious ureteral stones are seen.    There is a questionable filling defect in the urinary bladder at the UVJ on the left side.  Bladder lesion and mass cannot be excluded.  Cystoscopy or cystogram is recommended..    No colitis is seen.  No diverticulitis is seen.  No obvious colonic mass or lesion is seen.   There is a large inguinal hernia on the right side which extends into the scrotal sac.  It contains  portions of the large bowel and cecum as well as the small bowel.  The appendix is also consistent contained in this hernia.  There is peritoneal fat seen in the hernia as well.  No obvious obstruction is seen.                                       Medications   sodium chloride 0.9% bolus 1,000 mL 1,000 mL (0 mLs Intravenous Stopped 2/23/23 1341)   ketorolac injection 15 mg (15 mg Intravenous Given 2/23/23 1235)   ondansetron injection 4 mg (4 mg Intravenous Given 2/23/23 1235)   iopamidoL (ISOVUE-370) injection 100 mL (100 mLs Intravenous Given 2/23/23 1403)   cefTRIAXone (ROCEPHIN) 1 g in dextrose 5 % in water (D5W) 5 % 50 mL IVPB (MB+) (0 g Intravenous Stopped 2/23/23 1802)   morphine injection 4 mg (4 mg Intravenous Given 2/23/23 1744)   ondansetron injection 4 mg (4 mg Intravenous Given 2/23/23 1744)         Medical Decision Making  [unfilled]    Differential diagnosis (includes but is not limited to):   Urolithiasis, pyelonephritis, cystitis, hepatitis, gastroenteritis, gastritis, infection, sepsis, obstruction, SBO, hernia, incarcerated hernia, strangulated hernia, malignancy    MDM Narrative  62-year-old male presents for left flank pain that has been going on for the past day or so.  Has large right-sided inguinal hernia that has been present for a long time.  Labs are pending.  Urinalysis pending.  Pain and nausea control as needed.  EKG with no STEMI criteria.  CT of the abdomen and pelvis pending to rule out acute pathology.  Continue pulse oximetry and telemetry monitoring.  Telemetry monitor shows a sinus rhythm with heart rate in the 60s.    Update:  CT scan shows perinephric stranding around the left kidney that appears consistent with a pyelonephritis, I do not see evidence of infection on urinalysis but will cover with a dose of antibiotics in the emergency department.  There is also note made of a  filling defect in the left side of the bladder that may be due to a malignancy or mass.  Patient has required multiple rounds of pain medications without significant improvement in his pain.  Urology has been consulted in the case has been discussed, they recommended Medicine admission and NPO after midnight and they will evaluate for possible intervention/procedure.  Hospital Medicine has been consulted and has accepted the patient for admission.    Dispo:  Admit    My independent radiology interpretation:  See above  Point of care US (independently performed and interpreted):   Decision rules/clinical scoring:     Amount and/or Complexity of Data Reviewed  Independent historian: EMS   Summary of history:  Report received from EMS on patient arrival including chief complaint, vital signs, and medications given EN route  External data reviewed: notes from previous admissions, notes from previous ED visits, notes from clinic visits, and prior imaging  Summary of data reviewed:  Prior notes reviewed in the electronic medical record including prescription drug list  Risk and benefits of testing: discussed   Labs: ordered and reviewed  Radiology: ordered and independent interpretation performed (see above or ED course)  ECG/medicine tests: ordered and independent interpretation performed (see above or ED course)  Discussion of management or test interpretation with external provider(s): discussed with hospitalist physician and discussed with Urology consultant   Summary of discussion:  See above    Risk  Parenteral controlled substances   Drug therapy requiring intense monitoring for toxicity   Decision regarding hospitalization  Shared decision making     Critical Care  none    Data Reviewed/Counseling: I have personally reviewed the patient's vital signs, nursing notes, and other relevant tests, information, and imaging. I had a detailed discussion regarding the historical points, exam findings, and any diagnostic  results supporting the discharge diagnosis. I personally performed the history, PE, MDM and procedures as documented above and agree with the scribe's documentation.    Portions of this note were dictated using voice recognition software. Although it was reviewed for accuracy, some inherent voice recognition errors may have occurred and may be present in this document.    Problems Addressed:  CHF (congestive heart failure): acute illness or injury  Flank pain: acute illness or injury  Pyelonephritis: acute illness or injury with systemic symptoms  Unilateral recurrent inguinal hernia without obstruction or gangrene: chronic illness or injury with exacerbation, progression, or side effects of treatment    Amount and/or Complexity of Data Reviewed  Labs: ordered.  Radiology: ordered and independent interpretation performed.          Scribe Attestation:   Scribe #1: I performed the above scribed service and the documentation accurately describes the services I performed. I attest to the accuracy of the note.    Attending Attestation:           Physician Attestation for Scribe:  Physician Attestation Statement for Scribe #1: I, Feliberto Khan MD, reviewed documentation, as scribed by Nadeem Lowry in my presence, and it is both accurate and complete.           ED Course as of 03/04/23 1609   Thu Feb 23, 2023 1757 Case discussed with Urology, agrees with medicine admission, recommends keeping NPO and they will evaluate the patient [MC]      ED Course User Index  [MC] Feliberto Khan MD                 Clinical Impression:   Final diagnoses:  [R10.9] Flank pain  [K40.91] Unilateral recurrent inguinal hernia without obstruction or gangrene (Primary)  [N12] Pyelonephritis        ED Disposition Condition    Admit Stable                Feliberto Khan MD  03/04/23 [id number]

## 2023-02-24 LAB
AV INDEX (PROSTH): 0.25
AV MEAN GRADIENT: 19 MMHG
AV PEAK GRADIENT: 38 MMHG
AV VALVE AREA: 0.79 CM2
AV VELOCITY RATIO: 0.23
CV ECHO LV RWT: 0.43 CM
DOP CALC AO PEAK VEL: 3.1 M/S
DOP CALC AO VTI: 71.2 CM
DOP CALC LVOT AREA: 3.1 CM2
DOP CALC LVOT DIAMETER: 2 CM
DOP CALC LVOT PEAK VEL: 0.72 M/S
DOP CALC LVOT STROKE VOLUME: 55.89 CM3
DOP CALC MV VTI: 25 CM
DOP CALCLVOT PEAK VEL VTI: 17.8 CM
E WAVE DECELERATION TIME: 296 MSEC
E/A RATIO: 0.79
E/E' RATIO: 7.43 M/S
ECHO LV POSTERIOR WALL: 1.12 CM (ref 0.6–1.1)
EJECTION FRACTION: 55 %
FRACTIONAL SHORTENING: 27 % (ref 28–44)
INTERVENTRICULAR SEPTUM: 1.15 CM (ref 0.6–1.1)
LEFT ATRIUM SIZE: 3.6 CM
LEFT INTERNAL DIMENSION IN SYSTOLE: 3.82 CM (ref 2.1–4)
LEFT VENTRICLE DIASTOLIC VOLUME INDEX: 61.9 ML/M2
LEFT VENTRICLE DIASTOLIC VOLUME: 130 ML
LEFT VENTRICLE MASS INDEX: 110 G/M2
LEFT VENTRICLE SYSTOLIC VOLUME INDEX: 29.9 ML/M2
LEFT VENTRICLE SYSTOLIC VOLUME: 62.7 ML
LEFT VENTRICULAR INTERNAL DIMENSION IN DIASTOLE: 5.21 CM (ref 3.5–6)
LEFT VENTRICULAR MASS: 231.14 G
LV LATERAL E/E' RATIO: 6.5 M/S
LV SEPTAL E/E' RATIO: 8.67 M/S
LVOT MG: 1 MMHG
LVOT MV: 0.48 CM/S
MV MEAN GRADIENT: 1 MMHG
MV PEAK A VEL: 0.66 M/S
MV PEAK E VEL: 0.52 M/S
MV PEAK GRADIENT: 2 MMHG
MV STENOSIS PRESSURE HALF TIME: 126 MS
MV VALVE AREA BY CONTINUITY EQUATION: 2.24 CM2
MV VALVE AREA P 1/2 METHOD: 1.75 CM2
PISA TR MAX VEL: 2.04 M/S
SINUS: 4.7 CM
TDI LATERAL: 0.08 M/S
TDI SEPTAL: 0.06 M/S
TDI: 0.07 M/S
TR MAX PG: 17 MMHG
TRICUSPID ANNULAR PLANE SYSTOLIC EXCURSION: 2.26 CM

## 2023-02-24 PROCEDURE — 63600175 PHARM REV CODE 636 W HCPCS: Performed by: INTERNAL MEDICINE

## 2023-02-24 PROCEDURE — 25000003 PHARM REV CODE 250: Performed by: INTERNAL MEDICINE

## 2023-02-24 PROCEDURE — 25000003 PHARM REV CODE 250: Performed by: NURSE PRACTITIONER

## 2023-02-24 PROCEDURE — 96361 HYDRATE IV INFUSION ADD-ON: CPT

## 2023-02-24 PROCEDURE — 21400001 HC TELEMETRY ROOM

## 2023-02-24 PROCEDURE — 96366 THER/PROPH/DIAG IV INF ADDON: CPT

## 2023-02-24 PROCEDURE — G0378 HOSPITAL OBSERVATION PER HR: HCPCS

## 2023-02-24 RX ADMIN — HYDROCODONE BITARTRATE AND ACETAMINOPHEN 1 TABLET: 5; 325 TABLET ORAL at 06:02

## 2023-02-24 RX ADMIN — CEFTRIAXONE SODIUM 2 G: 2 INJECTION, POWDER, FOR SOLUTION INTRAMUSCULAR; INTRAVENOUS at 05:02

## 2023-02-24 RX ADMIN — HYDROCODONE BITARTRATE AND ACETAMINOPHEN 1 TABLET: 5; 325 TABLET ORAL at 11:02

## 2023-02-24 RX ADMIN — SODIUM CHLORIDE: 9 INJECTION, SOLUTION INTRAVENOUS at 12:02

## 2023-02-24 RX ADMIN — Medication 6 MG: at 12:02

## 2023-02-24 RX ADMIN — HYDROCODONE BITARTRATE AND ACETAMINOPHEN 1 TABLET: 5; 325 TABLET ORAL at 12:02

## 2023-02-24 RX ADMIN — METOPROLOL TARTRATE 50 MG: 50 TABLET, FILM COATED ORAL at 09:02

## 2023-02-24 RX ADMIN — SODIUM CHLORIDE: 9 INJECTION, SOLUTION INTRAVENOUS at 03:02

## 2023-02-24 NOTE — H&P
[facility] Hospital Medicine   History & Physical Note      Patient Name: Marc Neal  : 1960  MRN: [id number]  PCP: DANDRE Villegas  Admitting Service: Hospital Medicine  Attending Physician: Ariadna Carter MD  Admission Date: 2023 - IP- Inpatient   Length of Stay: 1  History source: EMR, patient and/or patient's family  Code status: Full    Chief Complaint   Flank Pain (Pt to ER via [facility] for left flank pain.  Started last night.  Has known hernia (causes testicular/penile swelling).  EMS gave fentanyl 100 mcg)      History of Present Illness   Mr. Neal is a 61 yo male with pmhx HTN, polysubstance use (meth and cocaine), hemorrhagic CVA with residual dysarthria (), Afib not on ACT, medical non-compliance CHFrEF (LVE 10% in May 2020), VHD/Mod-Severe AS presents to the ED with acute onset of left flank pain x1 day. PT reports cold sweats last night, but never took temperature. He also has a known right inguinal hernia  that causes testicular swelling, that has been causing him pain but this has been chronic.     Initial ED VS: T 98.6, HR 56, RR 18, /97, O2 100% RA. Labs remarkable for creatinine 1.38, UA with 2+ blood, 16 RBCs and 1+ protein.  CT of the abdomen and pelvis showed perinephric stranding around the left kidney consistent with likely pyelonephritis, filling defect in the urinary bladder at the UVJ on the left side bladder lesions suspected and a large inguinal hernia on the right containing portion of the cecum, multiple loops of small bowel.  The appendix is also seen within the hernia and some omentum is seen in the large right inguinal hernia the hernia extends into the scrotum.        ROS   Except as documented, all other systems reviewed and negative     Past Medical History   Essential HTN  CHFrEF (LVE 10% in )  Polysubstance Use (meth and cocaine)  Hemorrhagic CVA with residual dysarthria  Hx Afib, not on ACT  Moderate AS, scheduled  for TAVR in 2020 but left AMA  Medical Non-compliance  HX multiple AMA admissions and refusing to wear lifevest  Past Surgical History   negative    Social History   Smokes marijuana, crack and meth.  History of IV drug use over 30 years ago.  History of heavy alcohol use, now only drinks 1 beer a day.  He lives alone and is independent of ADLs    Family History   Reviewed and negative    Allergies   Patient has no known allergies.    Home Medications     Prior to Admission medications    Medication Sig Start Date End Date Taking? Authorizing Provider   metoprolol tartrate (LOPRESSOR) 50 MG tablet Take 50 mg by mouth 2 (two) times a day.    Historical Provider          Physical Exam   Vital Signs  Pulse:  [56]   Resp:  [14-18]   BP: (132)/(81)   SpO2:  [97 %]    General: Appears comfortable  HEENT: NC/AT  Neck:  No JVD  Chest: CTABL  CVS: Regular rhythm. Normal S1/S2, systolic murmur  Abdomen: nondistended, normoactive BS, soft and non-tender.  :  Scrotal swelling on the right, CVA tenderness on the left  MSK: No obvious deformity or joint swelling  Skin: Warm and dry  Neuro:  Dysarthric speech (chronic) no focal neurological deficit  Psych: Cooperative    Labs     Recent Labs     02/23/23  1003   WBC 9.2   RBC 4.81   HGB 15.3   HCT 43.6   MCV 90.6   MCH 31.8   MCHC 35.1   RDW 13.5        No results for input(s): PROTIME, INR, PTT, D-DIMER, FERRITIN, IRON, TRANS, TIBC, LABIRON, NEPTXXBR96, FOLATE, LDH, HAPTOGLOBIN, RETICCNTAUTO, RETABS, PERIPSMEAREV in the last 72 hours.   Recent Labs     02/23/23  1003   *   K 4.1   CHLORIDE 104   CO2 25   BUN 21.5   CREATININE 1.38*   EGFRNORACEVR 58   GLUCOSE 121*   CALCIUM 9.0   ALBUMIN 4.1   GLOBULIN 3.3   ALKPHOS 128   ALT 13   AST 21   BILITOT 1.3     No results for input(s): LACTIC in the last 72 hours.  No results for input(s): CPK, TROPONINI in the last 72 hours.       Microbiology Results (last 7 days)       Procedure Component Value Units Date/Time     Blood Culture #2 **CANNOT BE ORDERED STAT** [[id number]] Collected: 02/23/23 1841    Order Status: Resulted Specimen: Blood Updated: 02/23/23 1913    Blood Culture #1 **CANNOT BE ORDERED STAT** [[id number]] Collected: 02/23/23 1841    Order Status: Resulted Specimen: Blood Updated: 02/23/23 1913           Imaging     CT Abdomen Pelvis With Contrast   Final Result   Abnormal      Perinephric stranding around the left kidney consistent with likely pyelonephritis      Filling defect in the urinary bladder at the UVJ on the left side.  Bladder lesion is suspected.  Cystogram or cystoscopy is recommended      Large inguinal hernia on the right side containing a portion of the cecum and multiple loops of small bowel.  The appendix is also seen within the hernia.  Some omentum is also seen in the large right inguinal hernia.  The hernia extends into the scrotal sac      This report was flagged in Epic as abnormal.         Electronically signed by: Ramirez Rodriguez   Date:    02/23/2023   Time:    14:27        Assessment & Plan   Left Pyelonephritis vs stranding from UVJ obstruction with concerns for possible bladder lesion  Microscopic Hematuria  Bradycardia on BB  Essential HTN  Polysubstance Use (meth, cocaine)  Hx CHFrEF (LVEF 10% in 2020), compensated  Hx Afib, not on ACT, [id number] score 5  Hx Hemorrhagic CVA with dysarthria  Large Right Inguinal Hernia w/o incarceration (chronic)  VHD/Mod-Severe AS    PLAN:   - BCX2. IV Rocephin 2gm Q24H  - Gentle IV hydration given reduced EF. Avoid nephrotoxic agents and hold Renal sensitive medications.  - Urology consult for cystoscopy  - Echo to evaluate LVEF (pt refused life vest back in 2020)  - Long standing hx of medical non-compliance.  - Recommend ACT therapy prior to discharge.  - AM labs  - VTE Prophylaxis: no act pending urology procedure    IViola FNP-C have discussed this patients case with Dr. Carter who agrees with the diagnosis and treatment plan.

## 2023-02-24 NOTE — NURSING
Nurses Note -- 4 Eyes      2/24/2023   3:24 PM      Skin assessed during: Admit      [x] No Pressure Injuries Present    [x]Prevention Measures Documented      [] Yes- Altered Skin Integrity Present or Discovered   [] LDA Added if Not in Epic (Describe Wound)   [] New Altered Skin Integrity was Present on Admit and Documented in LDA   [] Wound Image Taken    Wound Care Consulted? No    Attending Nurse:  Nathan Arizmendi LPN     Second RN/Staff Member:  Pilar Orlando LPN

## 2023-02-24 NOTE — PROGRESS NOTES
02/24/23 0848   Discharge Assessment   Assessment Type Discharge Planning Assessment   Confirmed/corrected address, phone number and insurance Yes   Confirmed Demographics Correct on Facesheet   Source of Information patient   When was your last doctors appointment? 03/15/22  (PCP: Lashay Quiñonez)   Communicated CASEY with patient/caregiver Date not available/Unable to determine   Reason For Admission pyelonephritis   People in Home alone   Do you expect to return to your current living situation? Yes   Do you have help at home or someone to help you manage your care at home? Yes   Who are your caregiver(s) and their phone number(s)? has sitters   Prior to hospitilization cognitive status: Alert/Oriented   Current cognitive status: Alert/Oriented   Home Accessibility stairs to enter home   Number of Stairs, Main Entrance two   Home Layout Able to live on 1st floor   Equipment Currently Used at Home none   Readmission within 30 days? No   Patient currently being followed by outpatient case management? No   Do you currently have service(s) that help you manage your care at home? Yes   Name and Contact number of agency sitter service   Do you take prescription medications? Yes  (fills rx at [facility] on [address])   Do you have prescription coverage? Yes   Coverage [employer]   Do you have any problems affording any of your prescribed medications? No   Is the patient taking medications as prescribed? yes   How do you get to doctors appointments? car, drives self   Are you on dialysis? No   Do you take coumadin? No   Discharge Plan A Home   Discharge Plan B Home Health   DME Needed Upon Discharge  none   Discharge Plan discussed with: Patient   Discharge Barriers Identified None

## 2023-02-24 NOTE — ED NOTES
Spoke with DANDRE Ozuna regarding plans for patient. Per NP, states that there is no apparent reason to keep him from their standpoint and is stable enough to be discharged. Will follow up with hospitalist regarding these plans.

## 2023-02-24 NOTE — CONSULTS
Marc Neal 1960  [id number]  2/24/2023    CONSULTING PHYSICIAN: Feliberto Khan MD    Reason for consultation: bladder mass, left pyelonephritis    HPI: Mr. 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  He stated the pain began last night after he ate bad chicken. He reports cold sweats, did not check temperature at home. He has inguinal hernia which he states causes testicular swelling which causes him chronic pain. Labs on arrival reveal no leukocytosis, BUN/Cr 21.5/1.38 (Cr 1.08 in December); UA reveals clear yellow urine, 16 RBC, <5 WBC, no bacteria. CT abd/pelvis on admission revealed perinephric stranding around left kidney and filling defect in the urinary bladder at the left UVJ, bladder lesions suspected. No evidence of hydroureteronephrosis. There is a large inguinal hernia on the right which contains a portion of the cecum and multiple loops of small bowel; hernia extends into the scrotum.      The patient continues with some left flank pain. His main complaint is that he is hungry. He states he wants to get back to work. He denies tobacco use/smoking. Denies any known family history of urologic malignancies. He denies ever having prostate screening, states he does not go to the doctor. He reports nocturia, gets up to urinate 3-4 times a night. He denies any hematuria or dysuria.    Past Medical History:   Diagnosis Date    Essential HTN  CHFrEF (LVE 10% in 2020)  Polysubstance Use (meth and cocaine)  Hemorrhagic CVA with residual dysarthria  Hx Afib, not on ACT  Moderate AS, scheduled for TAVR in 2020 but left AMA  Medical Non-compliance  HX multiple AMA admissions and refusing to wear  lifevest      No past surgical history on file.    No family history on file.    Social History     Tobacco Use    Smokeless tobacco: Never   Substance Use Topics    Alcohol use: Yes     Alcohol/week: 3.0 standard drinks     Types: 3 Standard drinks or equivalent per week    Drug use: Yes     Frequency: 1.0 times per week     Types: Cocaine, Methamphetamines       Current Facility-Administered Medications   Medication Dose Route Frequency Provider Last Rate Last Admin    0.9%  NaCl infusion   Intravenous Continuous DANDRE Rocha 50 mL/hr at 02/24/23 0700 Rate Change at 02/24/23 0700    acetaminophen tablet 1,000 mg  1,000 mg Oral Q6H PRN [facility] MD Raul        acetaminophen tablet 650 mg  650 mg Oral Q4H PRN [facility] MD Raul        aluminum-magnesium hydroxide-simethicone 200-200-20 mg/5 mL suspension 30 mL  30 mL Oral QID PRN Ali M MD Raul        cefTRIAXone (ROCEPHIN) 2 g in dextrose 5 % in water (D5W) 5 % 50 mL IVPB (MB+)  2 g Intravenous Daily Ali M MD Raul        cloNIDine tablet 0.2 mg  0.2 mg Oral TID PRN Ali M MD Raul        hydrALAZINE injection 10 mg  10 mg Intravenous Q4H PRN Ali M MD Raul        HYDROcodone-acetaminophen 5-325 mg per tablet 1 tablet  1 tablet Oral Q6H PRN [facility] MD Raul   1 tablet at 02/24/23 1152    melatonin tablet 6 mg  6 mg Oral Nightly PRN [facility] MD Raul   6 mg at 02/24/23 0020    metoprolol tartrate (LOPRESSOR) tablet 50 mg  50 mg Oral BID Ali M MD Raul   50 mg at 02/24/23 0903    morphine injection 4 mg  4 mg Intravenous Q4H PRN Ali M MD Raul        ondansetron injection 4 mg  4 mg Intravenous Q4H PRN Ali M MD Raul        polyethylene glycol packet 17 g  17 g Oral BID PRN [facility] MD Raul        prochlorperazine injection Soln 5 mg  5 mg Intravenous Q6H PRN Ali M MD Raul        senna-docusate 8.6-50 mg per tablet 2 tablet  2 tablet Oral BID PRN [facility] MD Raul        sodium chloride 0.9% flush 10 mL  10 mL Intravenous PRN Ali M MD Raul         Current  Outpatient Medications   Medication Sig Dispense Refill    metoprolol tartrate (LOPRESSOR) 50 MG tablet Take 50 mg by mouth 2 (two) times a day.       Review of patient's allergies indicates:  No Known Allergies    ROS: 12 point review of systems negative other than the HPI    PHYSICAL EXAM:  Vitals:    02/24/23 0659 02/24/23 0730 02/24/23 0903 02/24/23 1152   BP:  116/78 137/69    Pulse:  62 61    Resp:    20   Temp: 98.1 °F (36.7 °C)      TempSrc:       SpO2:  97%           Intake/Output Summary (Last 24 hours) at 2/24/2023 1407  Last data filed at 2/23/2023 1822  Gross per 24 hour   Intake --   Output 200 ml   Net -200 ml     GEN: NAD, dysarthria (s/t previous CVA)  HEENT: normocephalic, atraumatic  RESP: Even and unlabored  ABD: soft, NTND  : Dark yellow urine in urinal. No CVA tenderness  NEURO: no focal deficits, MAEW, AAOx4    LABS:  Recent Results (from the past 24 hour(s))   Echo    Collection Time: 02/24/23  9:58 AM   Result Value Ref Range    TDI SEPTAL 0.06 m/s    LV LATERAL E/E' RATIO 6.50 m/s    LV SEPTAL E/E' RATIO 8.67 m/s    Left Ventricular Outflow Tract Mean Velocity 0.48 cm/s    Left Ventricular Outflow Tract Mean Gradient 1.00 mmHg    TDI LATERAL 0.08 m/s    LVIDd 5.21 3.5 - 6.0 cm    IVS 1.15 (A) 0.6 - 1.1 cm    Posterior Wall 1.12 (A) 0.6 - 1.1 cm    LVIDs 3.82 2.1 - 4.0 cm    FS 27 28 - 44 %    LV mass 231.14 g    LA size 3.60 cm    TAPSE 2.26 cm    Left Ventricle Relative Wall Thickness 0.43 cm    AV mean gradient 19 mmHg    AV valve area 0.79 cm2    AV Velocity Ratio 0.23     AV index (prosthetic) 0.25     MV mean gradient 1 mmHg    MV valve area p 1/2 method 1.75 cm2    MV valve area by continuity eq 2.24 cm2    E/A ratio 0.79     Mean e' 0.07 m/s    E wave deceleration time 296.00 msec    LVOT diameter 2.00 cm    LVOT area 3.1 cm2    LVOT peak jackelin 0.72 m/s    LVOT peak VTI 17.80 cm    Ao peak jackelin 3.1 m/s    Ao VTI 71.20 cm    LVOT stroke volume 55.89 cm3    AV peak gradient 38 mmHg     MV peak gradient 2 mmHg    E/E' ratio 7.43 m/s    MV Peak E Andrey 0.52 m/s    TR Max Andrey 2.04 m/s    MV VTI 25.0 cm    MV stenosis pressure 1/2 time 126.00 ms    MV Peak A Andrey 0.66 m/s    LV Systolic Volume 62.70 mL    LV Diastolic Volume 130.00 mL    Triscuspid Valve Regurgitation Peak Gradient 17 mmHg    EF 55 %    Sinus 4.70 cm     IMAGING:  Independently reviewed.   CT ABDOMEN PELVIS WITH CONTRAST     CLINICAL HISTORY:   Flank pain, kidney stone suspected;     TECHNIQUE:   Low dose axial images, sagittal and coronal reformations were obtained from the lung bases to the pubic symphysis following the IV administration of contrast. Automatic exposure control (AEC) is utilized to reduce patient radiation exposure.     COMPARISON:   None.     FINDINGS:   The lung bases are clear.     The liver appears normal.  No liver mass or lesion is seen.  Portal and hepatic veins appear normal.     The gallbladder appears normal.  No obvious gallstones are seen.  No biliary dilatation is seen.  No pericholecystic fluid is seen.     The pancreas appears normal.  No pancreatic mass or lesion is seen.     The spleen shows no acute abnormality.     The adrenal glands appear normal.  No adrenal nodule is seen.     There is perinephric stranding around the left kidney.  No hydronephrosis is seen.  No hydroureter is seen.  No nephrolithiasis is seen.  No obvious ureteral stones are seen.     There is a questionable filling defect in the urinary bladder at the UVJ on the left side.  Bladder lesion and mass cannot be excluded.  Cystoscopy or cystogram is recommended..     No colitis is seen.  No diverticulitis is seen.  No obvious colonic mass or lesion is seen.  There is a large inguinal hernia on the right side which extends into the scrotal sac.  It contains  portions of the large bowel and cecum as well as the small bowel.  The appendix is also consistent contained in this hernia.  There is peritoneal fat seen in the hernia as well.   No obvious obstruction is seen.    Impression:       Perinephric stranding around the left kidney consistent with likely pyelonephritis     Filling defect in the urinary bladder at the UVJ on the left side.  Bladder lesion is suspected.  Cystogram or cystoscopy is recommended     Large inguinal hernia on the right side containing a portion of the cecum and multiple loops of small bowel.  The appendix is also seen within the hernia.  Some omentum is also seen in the large right inguinal hernia.  The hernia extends into the scrotal sac     This report was flagged in Epic as abnormal.       Electronically signed by: Ramirez Rodriguez   Date: 02/23/2023   Time: 14:27     ASSESSMENT:  Left flank pain with filling defect in urinary bladder at UVJ on the left, suspect bladder lesions.   No evidence of pyelonephritis - no fever, leukocytosis, bacteria in urine, or CVA tenderness  Multiple co-morbidities with history of medical non-compliance    PLAN:  There is no indication or urgent surgical intervention at this time.   OK for discharge home from Urology standpoint.   He will need to f/u outpatient for cystoscopy in 2 weeks.   Discussed with nursing.      Patricia Enrique, JENIFER-BC

## 2023-02-24 NOTE — CARE UPDATE
Seen and examined the patient agree the plan the H& P.  Will wait for Urology evaluation  - continue antibiotics and pain control.    Restarted home medications.  -possible discharge tomorrow candace..    Xavi Rosales M.D.  [facility]/Hospital Medicine Department  [facility]

## 2023-02-24 NOTE — NURSING
Report received from CHEVY Jay in ED at 1415. Patient arrived to room 1053 at 1433. This nurse assumed care of patient at this time.

## 2023-02-25 VITALS
SYSTOLIC BLOOD PRESSURE: 114 MMHG | HEART RATE: 60 BPM | BODY MASS INDEX: 25.13 KG/M2 | OXYGEN SATURATION: 99 % | RESPIRATION RATE: 17 BRPM | TEMPERATURE: 98 F | DIASTOLIC BLOOD PRESSURE: 71 MMHG | WEIGHT: 189.63 LBS | HEIGHT: 73 IN

## 2023-02-25 PROBLEM — R10.9 FLANK PAIN: Status: ACTIVE | Noted: 2023-02-25

## 2023-02-25 LAB
ALBUMIN SERPL-MCNC: 3.7 G/DL (ref 3.4–4.8)
ALBUMIN/GLOB SERPL: 1.4 RATIO (ref 1.1–2)
ALP SERPL-CCNC: 109 UNIT/L (ref 40–150)
ALT SERPL-CCNC: 11 UNIT/L (ref 0–55)
AST SERPL-CCNC: 16 UNIT/L (ref 5–34)
BASOPHILS # BLD AUTO: 0.03 X10(3)/MCL (ref 0–0.2)
BASOPHILS NFR BLD AUTO: 0.6 %
BILIRUBIN DIRECT+TOT PNL SERPL-MCNC: 0.6 MG/DL
BUN SERPL-MCNC: 18.2 MG/DL (ref 8.4–25.7)
CALCIUM SERPL-MCNC: 8.6 MG/DL (ref 8.8–10)
CHLORIDE SERPL-SCNC: 110 MMOL/L (ref 98–107)
CO2 SERPL-SCNC: 26 MMOL/L (ref 23–31)
CREAT SERPL-MCNC: 0.95 MG/DL (ref 0.73–1.18)
EOSINOPHIL # BLD AUTO: 0.12 X10(3)/MCL (ref 0–0.9)
EOSINOPHIL NFR BLD AUTO: 2.3 %
ERYTHROCYTE [DISTWIDTH] IN BLOOD BY AUTOMATED COUNT: 13.5 % (ref 11.5–17)
GFR SERPLBLD CREATININE-BSD FMLA CKD-EPI: >60 MLS/MIN/1.73/M2
GLOBULIN SER-MCNC: 2.7 GM/DL (ref 2.4–3.5)
GLUCOSE SERPL-MCNC: 89 MG/DL (ref 82–115)
HCT VFR BLD AUTO: 40.2 % (ref 42–52)
HGB BLD-MCNC: 13.7 G/DL (ref 14–18)
IMM GRANULOCYTES # BLD AUTO: 0.01 X10(3)/MCL (ref 0–0.04)
IMM GRANULOCYTES NFR BLD AUTO: 0.2 %
LYMPHOCYTES # BLD AUTO: 1.93 X10(3)/MCL (ref 0.6–4.6)
LYMPHOCYTES NFR BLD AUTO: 36.8 %
MAGNESIUM SERPL-MCNC: 2.1 MG/DL (ref 1.6–2.6)
MCH RBC QN AUTO: 31.3 PG
MCHC RBC AUTO-ENTMCNC: 34.1 G/DL (ref 33–36)
MCV RBC AUTO: 91.8 FL (ref 80–94)
MONOCYTES # BLD AUTO: 0.43 X10(3)/MCL (ref 0.1–1.3)
MONOCYTES NFR BLD AUTO: 8.2 %
NEUTROPHILS # BLD AUTO: 2.72 X10(3)/MCL (ref 2.1–9.2)
NEUTROPHILS NFR BLD AUTO: 51.9 %
NRBC BLD AUTO-RTO: 0 %
PHOSPHATE SERPL-MCNC: 3.1 MG/DL (ref 2.3–4.7)
PLATELET # BLD AUTO: 152 X10(3)/MCL (ref 130–400)
PMV BLD AUTO: 10 FL (ref 7.4–10.4)
POTASSIUM SERPL-SCNC: 3.8 MMOL/L (ref 3.5–5.1)
PROT SERPL-MCNC: 6.4 GM/DL (ref 5.8–7.6)
RBC # BLD AUTO: 4.38 X10(6)/MCL (ref 4.7–6.1)
SODIUM SERPL-SCNC: 140 MMOL/L (ref 136–145)
WBC # SPEC AUTO: 5.2 X10(3)/MCL (ref 4.5–11.5)

## 2023-02-25 PROCEDURE — 84100 ASSAY OF PHOSPHORUS: CPT | Performed by: NURSE PRACTITIONER

## 2023-02-25 PROCEDURE — G0378 HOSPITAL OBSERVATION PER HR: HCPCS

## 2023-02-25 PROCEDURE — 83735 ASSAY OF MAGNESIUM: CPT | Performed by: NURSE PRACTITIONER

## 2023-02-25 PROCEDURE — 25000003 PHARM REV CODE 250: Performed by: INTERNAL MEDICINE

## 2023-02-25 PROCEDURE — 85025 COMPLETE CBC W/AUTO DIFF WBC: CPT | Performed by: NURSE PRACTITIONER

## 2023-02-25 PROCEDURE — 80053 COMPREHEN METABOLIC PANEL: CPT | Performed by: NURSE PRACTITIONER

## 2023-02-25 RX ORDER — LEVOFLOXACIN 500 MG/1
500 TABLET, FILM COATED ORAL DAILY
Qty: 7 TABLET | Refills: 0 | Status: SHIPPED | OUTPATIENT
Start: 2023-02-25 | End: 2023-03-04

## 2023-02-25 RX ADMIN — METOPROLOL TARTRATE 50 MG: 50 TABLET, FILM COATED ORAL at 08:02

## 2023-02-25 NOTE — PLAN OF CARE
Problem: Adult Inpatient Plan of Care  Goal: Plan of Care Review  Outcome: Ongoing, Progressing  Flowsheets (Taken 2/25/2023 0406)  Plan of Care Reviewed With: patient  Goal: Patient-Specific Goal (Individualized)  Outcome: Ongoing, Progressing  Goal: Absence of Hospital-Acquired Illness or Injury  Outcome: Ongoing, Progressing  Goal: Optimal Comfort and Wellbeing  Outcome: Ongoing, Progressing  Goal: Readiness for Transition of Care  Outcome: Ongoing, Progressing     Problem: Skin Injury Risk Increased  Goal: Skin Health and Integrity  Outcome: Ongoing, Progressing

## 2023-02-25 NOTE — DISCHARGE SUMMARY
[facility] Hospital Medicine Discharge Summary    Admit Date: 2/23/2023  Discharge Date and Time: 2/25/23 8:35 AM  Admitting Physician:  Team  Discharging Physician: Pedro Prado MD.  Primary Care Physician: DANDRE Villegas  Consults: Urology    Discharge Diagnoses:  Left flank pain with filling defect in urinary bladder at UVJ on the left, suspect bladder lesions.   Microscopic Hematuria  Bradycardia on BB  Essential HTN  Polysubstance Use (meth, cocaine)  Hx CHFrEF (LVEF 10% in 2020), compensated  Hx Afib, not on ACT, [id number] score 5  Hx Hemorrhagic CVA with dysarthria  Large Right Inguinal Hernia w/o incarceration (chronic)  VHD/Mod-Severe AS    Multiple co-morbidities with history of medical non-compliance    Hospital Course:   Mr. Neal is a 63 yo male with pmhx HTN, polysubstance use (meth and cocaine), hemorrhagic CVA with residual dysarthria (2020), Afib not on ACT, medical non-compliance CHFrEF (LVE 10% in May 2020), VHD/Mod-Severe AS presents to the ED with acute onset of left flank pain x1 day. PT reports cold sweats last night, but never took temperature. He also has a known right inguinal hernia  that causes testicular swelling, that has been causing him pain but this has been chronic.      Initial ED VS: T 98.6, HR 56, RR 18, /97, O2 100% RA. Labs remarkable for creatinine 1.38, UA with 2+ blood, 16 RBCs and 1+ protein.  CT of the abdomen and pelvis showed perinephric stranding around the left kidney consistent with likely pyelonephritis, filling defect in the urinary bladder at the UVJ on the left side bladder lesions suspected and a large inguinal hernia on the right containing portion of the cecum, multiple loops of small bowel.  The appendix is also seen within the hernia and some omentum is seen in the large right inguinal hernia the hernia extends into the scrotum.    Pt was started empirically on antibiotics, Urology was consulted for  cystourethroscopic evaluation, recommended outpatient follow up, no in-house interventions needed. No evidence of pyelonephritis - no fever, leukocytosis, bacteria in urine, or CVA tenderness,Urology cleared for discharge.     Pt was seen and examined on the day of discharge,reported improved to no pian,tolerating po diet, discharged to home on po abx to complete course empirically . Recommended to have Urology f/u OP,pt verbalized understanding.       Vitals:  VITAL SIGNS: 24 HRS MIN & MAX LAST   No data recorded 98.1 °F (36.7 °C)   No data recorded 114/71     No data recorded  60   No data recorded 17   No data recorded 99 %       Physical Exam:  General: Appears comfortable  HEENT: NC/AT  Neck:  No JVD  Chest: CTABL  CVS: Regular rhythm. Normal S1/S2, systolic murmur  Abdomen: nondistended, normoactive BS, soft and non-tender.  :  No CVA tenderness on the left  MSK: No obvious deformity or joint swelling  Skin: Warm and dry  Neuro:  Dysarthric speech (chronic) no focal neurological deficit  Psych: Cooperative    Procedures Performed: None     Significant Diagnostic Studies: See Full reports for all details    Recent Labs   Lab 02/25/23  0343   WBC 5.2   RBC 4.38*   HGB 13.7*   HCT 40.2*   MCV 91.8   MCH 31.3   MCHC 34.1   RDW 13.5      MPV 10.0       Recent Labs   Lab 02/25/23  0343      K 3.8   CO2 26   BUN 18.2   CREATININE 0.95   CALCIUM 8.6*   MG 2.10   ALBUMIN 3.7   ALKPHOS 109   ALT 11   AST 16   BILITOT 0.6        Microbiology Results (last 7 days)       ** No results found for the last 168 hours. **             Echo  · The left ventricle is normal in size with concentric remodeling and   normal systolic function.  · The estimated ejection fraction is 55%.  · Normal right ventricular size with normal right ventricular systolic   function.  · The ascending aorta is moderately dilated.  · The sinuses of Valsalva is moderately dilated.  · Mild pulmonic regurgitation.  · There is  moderate-to-severe aortic valve stenosis.  · Aortic valve area is 0.79 cm2; peak velocity is 3.1 m/s; mean gradient   is 19 mmHg.            Medication List        START taking these medications      levoFLOXacin 500 MG tablet  Commonly known as: LEVAQUIN  Take 1 tablet (500 mg total) by mouth once daily. for 7 days            CONTINUE taking these medications      metoprolol tartrate 50 MG tablet  Commonly known as: LOPRESSOR               Where to Get Your Medications        These medications were sent to [facility]/pharmacy #[id number] - [address] AT [address], [place] [address]      Phone: [phone]   levoFLOXacin 500 MG tablet          Explained in detail to the patient about the discharge plan, medications, and follow-up visits. Pt understands and agrees with the treatment plan  Discharge Disposition: Home or Self Care   Discharged Condition: stable  Diet-        Medications Per PA med rec  Activities as tolerated   Follow-up Information       Elan Ruth MD Follow up in 2 week(s).    Specialty: Urology  Why: for cystoscopy, bladder tumor  Contact information:  [address] [place] [address]  [phone]               DANDRE Villegas Follow up.    Specialty: Family Medicine  Contact information:  [address] [place] [address]  [phone]                           For further questions contact hospitalist office    Discharge time 33 minutes    For worsening symptoms, chest pain, shortness of breath, increased abdominal pain, high grade fever, stroke or stroke like symptoms, immediately go to the nearest Emergency Room or call 911 as soon as possible.      Pedro Eduardo M.D on 2/25/23 . at 8:35 AM.

## 2023-02-28 LAB
BACTERIA BLD CULT: NORMAL
BACTERIA BLD CULT: NORMAL

## 2023-03-01 ENCOUNTER — PATIENT OUTREACH (OUTPATIENT)
Dept: ADMINISTRATIVE | Facility: CLINIC | Age: 63
End: 2023-03-01
Payer: MEDICAID

## 2023-03-01 NOTE — PROGRESS NOTES
C3 nurse attempted to contact Marc Neal for a TCC post hospital discharge follow up call. No answer. The patient does not have a scheduled HOSFU appointment, and the pt does not have an [facility] PCP.

## 2023-03-02 NOTE — PROGRESS NOTES
3rd attempt-C3 nurse attempted to contact Marc Neal for a TCC post hospital discharge follow up call. No answer. The patient does not have a scheduled HOSFU appointment, and the pt does not have an [facility] PCP.

## 2023-05-29 PROBLEM — N12 PYELONEPHRITIS OF LEFT KIDNEY: Status: RESOLVED | Noted: 2023-02-23 | Resolved: 2023-05-29
